# Patient Record
(demographics unavailable — no encounter records)

---

## 2017-03-24 NOTE — ED PDOC
HPI: CCC, URI, Sore Throat


Time Seen by Provider: 17 17:05


Chief Complaint (Nursing): Fever


Chief Complaint (Provider): Sore Throat


History Per: Patient


History/Exam Limitations: no limitations


Have you had recent travel within the past 21 days to any of the following 

countries: [place], [place], [place] or [place]?: No


Onset/Duration Of Symptoms: Days (since last night)


Current Symptoms Are (Timing): Still Present


Associated Symptoms: Fever (subjective), Chills, Sore Throat (w/hoarseness of 

voice).  denies: Cough, Other (no chest pain)


Ear Symptoms: Bilateral: Ear Pain


Severity: Moderate


Additional Complaint(s): 


Clark Haddad is a 24 year old female, with no pertinent past medical history, 

who presents to the ED on 17 for the evaluation of a moderate sore throat 

that she has experienced since last night. Associated subjective fever, chills, 

b/l ear pain and hoarseness of voice also reported, though she denies chest 

pain or cough. Has medicated with Tylenol without relief, last dose being as of 

6 hours prior to arrival.





PMD: none





Past Medical History


Reviewed: Historical Data, Nursing Documentation, Vital Signs


Vital Signs: 


 Last Vital Signs











Temp  102.6 F H  17 16:57


 


Pulse  108 H  17 16:57


 


Resp  20   17 16:57


 


BP  115/56 L  17 16:57


 


Pulse Ox  95   17 17:17














- Medical History


PMH: No Chronic Diseases





- Surgical History


Surgical History: No Surg Hx





- Family History


Family History: States: Unknown Family Hx





- Home Medications


Home Medications: 


 Ambulatory Orders











 Medication  Instructions  Recorded


 


Azithromycin [Zithromax] 250 mg PO DAILY #6 tab 17














- Allergies


Allergies/Adverse Reactions: 


 Allergies











Allergy/AdvReac Type Severity Reaction Status Date / Time


 


spice Allergy  SWELLING Uncoded 17 16:57














Review of Systems


Constitutional: Positive for: Fever (subjective), Chills


ENT: Positive for: Ear Pain (b/l), Throat Pain


Cardiovascular: Negative for: Chest Pain


Respiratory: Negative for: Cough





Physical Exam





- Reviewed


Nursing Documentation Reviewed: Yes


Vital Signs Reviewed: Yes





- Physical Exam


Appears: Positive for: Non-toxic, No Acute Distress


Head Exam: Positive for: ATRAUMATIC, NORMOCEPHALIC


Skin: Positive for: Normal Color, Warm, Dry


Eye Exam: Positive for: Normal appearance, PERRL


ENT: Positive for: TM Is/Are (normal b/l), Pharyngeal Erythema, Tonsillar 

Swelling (b/l w/erythema), Other ((+) uvular swelling but no deviation).  

Negative for: Tonsillar Exudate


Neck: Positive for: Normal, Painless ROM, Supple


Cardiovascular/Chest: Positive for: Regular Rate, Rhythm.  Negative for: Murmur


Respiratory: Positive for: Normal Breath Sounds.  Negative for: Respiratory 

Distress


Lymphatic: Positive for: Adenopathy ((+) anterior cervical)


Neurologic/Psych: Positive for: Alert, Oriented





- ECG


O2 Sat by Pulse Oximetry: 95 (RA)


Pulse Ox Interpretation: Normal





Medical Decision Making


Medical Decision Makin:05


Initial Impression: tonsillitis, will r/o strep





Initial Plan:


* Rapid Strep


* Upreg


* Motrin 600mg PO


* Reevaluation


(+) strep -Pt will get Z-pack


Motrin for pain 


advised she is contagious. 


VS improved, pt admits to improved symptoms. 


--------------------------------------------------------------------------------

----------------- 


Scribe Attestation:


Documented by Carey Thomas, acting as a scribe for Marietta Watt PA-C.





Provider Scribe Attestation:


All medical record entries made by the Scribe were at my direction and 

personally dictated by me. I have reviewed the chart and agree that the record 

accurately reflects my personal performance of the history, physical exam, 

medical decision making, and the department course for this patient. I have 

also personally directed, reviewed, and agree with the discharge instructions 

and disposition.





Disposition





- Clinical Impression


Clinical Impression: 


 Streptococcal sore throat


Counseled Patient/Family Regarding: Studies Performed, Diagnosis, Need For 

Followup, Rx Given





- Disposition


Referrals: 


[facility] [Outside]


Disposition: Routine/Home


Disposition Time: 17:47


Condition: IMPROVED


Prescriptions: 


Azithromycin [Zithromax] 250 mg PO DAILY #6 tab


Instructions:  Strep Throat (ED)


Forms:  [facility] ED School/Work Excuse